# Patient Record
(demographics unavailable — no encounter records)

---

## 2025-03-20 NOTE — ASSESSMENT
[FreeTextEntry1] : 67 yo m h/o CKD 3 due to htn nephrosclerosis Last seen in 2016  Followed  up with [facility] Internal medicine No renal US recently   3/20 Feels well Did not sleep well due to wife snoring HTN BP elevated today possibly from insomnia Hypertensive Nephrosclerosis CKD 3 / 90 Will take BP for several days  and monitor for improvement will call us with results

## 2025-03-20 NOTE — HISTORY OF PRESENT ILLNESS
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

## 2025-07-24 NOTE — ASSESSMENT
[FreeTextEntry1] : 69 year old male with hx of CKD, BPH with urinary frequency and urgency and acceptable PVR.   For BPH, the anatomy of the lower genitourinary tract was explained to the patient, with the urine passing through the bladder neck and prostatic urethra as it exits the body. Prostatic enlargement can constrict the lumen and the bladder outlet, causing incomplete bladder emptying and irritative symptoms such and frequency and urgency. Alpha blockers can be used to relax the bladder neck and facilitate passage of urine from the bladder. 5-alpha reductase inhibitors can be employed to shrink the prostate and thereby allow for passage of urine more easily. Anticholinergics to decrease bladder irritative symptoms were also discussed, but it was stressed that they can increase post void residual and risk urinary retention. Patient will continue his Doxazosin, but does not wish to increase dose or try other medications at this time as he is not too bothered by his urinary symptoms and will observe. Will obtain UA and UCx and treat accordingly.  PSA Screening: RASTA benign.  Discussed PSA screening and latest recommendations/guidelines- USPTF and AUA.  Patient does want to continue PSA screening.  RTO annually or sooner PRN.

## 2025-07-24 NOTE — HISTORY OF PRESENT ILLNESS
[FreeTextEntry1] : 69 year old man seen 07/24/2025 with complaint of urinary frequency and urgency. This began about 2 months ago.  Patient reports he noticed increase in his urinary frequency, urgency and improvement in his urinary flow for the past 2 months ever since he was started on Doxazosin 4 mg daily for his HTN. Patient does admit to drinking 2-3L water everyday due to his history of CKD which he also attributes to his urinary frequency. He reports he voids every 2 hours or so during the day and has nocturia 2-3x. Reports a good stream. Reports he has his PSA checked annually by his PCP, most recently in 4/2025 noted to be 1.3 and creatinine was noted to be 1.69.  Baseline Creatinine around 1.6. Patient also had a RBUS in 2/2025 which showed minimal PVR and simple renal cysts. It is mild to moderate in severity. Nothing makes the symptoms better, hydrating aggressively makes sx worse.  It is associated with nothing. No hematuria, no dysuria, no hesitancy, no straining. No incontinence.  No fevers, no chills, no nausea, no vomiting, no flank pain.  Random bladder scan: 78 mL.  No family history contributory to BPH. No family history of Prostate Ca.

## 2025-07-24 NOTE — REVIEW OF SYSTEMS
[Negative] : Heme/Lymph [see HPI] : see HPI [Wake up at night to urinate  How many times?  ___] : wakes up to urinate [unfilled] times during the night [FreeTextEntry2] : frequency appr 5

## 2025-07-24 NOTE — PHYSICAL EXAM
[Normal Appearance] : normal appearance [Well Groomed] : well groomed [General Appearance - In No Acute Distress] : no acute distress [Edema] : no peripheral edema [Respiration, Rhythm And Depth] : normal respiratory rhythm and effort [Exaggerated Use Of Accessory Muscles For Inspiration] : no accessory muscle use [Abdomen Soft] : soft [Abdomen Tenderness] : non-tender [Costovertebral Angle Tenderness] : no ~M costovertebral angle tenderness [Urethral Meatus] : meatus normal [Urinary Bladder Findings] : the bladder was normal on palpation [Testes Tenderness] : no tenderness of the testes [Testes Mass (___cm)] : there were no testicular masses [Prostate Tenderness] : the prostate was not tender [No Prostate Nodules] : no prostate nodules [Prostate Size ___ gm] : prostate size [unfilled] gm [Normal Station and Gait] : the gait and station were normal for the patient's age [] : no rash [No Focal Deficits] : no focal deficits [Oriented To Time, Place, And Person] : oriented to person, place, and time [Affect] : the affect was normal [Mood] : the mood was normal [No Palpable Adenopathy] : no palpable adenopathy [Chaperone Present] : A chaperone was present in the examining room during all aspects of the physical examination [FreeTextEntry2] : LOUIS Serrato